# Patient Record
Sex: MALE | Race: OTHER | URBAN - METROPOLITAN AREA
[De-identification: names, ages, dates, MRNs, and addresses within clinical notes are randomized per-mention and may not be internally consistent; named-entity substitution may affect disease eponyms.]

---

## 2019-06-01 ENCOUNTER — OUTPATIENT (OUTPATIENT)
Dept: OUTPATIENT SERVICES | Facility: HOSPITAL | Age: 34
LOS: 1 days | End: 2019-06-01
Payer: MEDICAID

## 2019-06-01 PROCEDURE — G9001: CPT

## 2019-06-19 DIAGNOSIS — Z71.89 OTHER SPECIFIED COUNSELING: ICD-10-CM

## 2024-03-11 ENCOUNTER — EMERGENCY (EMERGENCY)
Facility: HOSPITAL | Age: 39
LOS: 1 days | Discharge: HOME | End: 2024-03-11
Attending: EMERGENCY MEDICINE | Admitting: EMERGENCY MEDICINE
Payer: MEDICAID

## 2024-03-11 VITALS
SYSTOLIC BLOOD PRESSURE: 181 MMHG | WEIGHT: 169.98 LBS | DIASTOLIC BLOOD PRESSURE: 72 MMHG | OXYGEN SATURATION: 95 % | HEART RATE: 88 BPM | TEMPERATURE: 98 F | RESPIRATION RATE: 18 BRPM

## 2024-03-11 VITALS
TEMPERATURE: 99 F | RESPIRATION RATE: 18 BRPM | OXYGEN SATURATION: 97 % | DIASTOLIC BLOOD PRESSURE: 79 MMHG | HEART RATE: 63 BPM | SYSTOLIC BLOOD PRESSURE: 156 MMHG

## 2024-03-11 DIAGNOSIS — F11.13 OPIOID ABUSE WITH WITHDRAWAL: ICD-10-CM

## 2024-03-11 DIAGNOSIS — R51.9 HEADACHE, UNSPECIFIED: ICD-10-CM

## 2024-03-11 PROCEDURE — 99284 EMERGENCY DEPT VISIT MOD MDM: CPT

## 2024-03-11 RX ORDER — ACETAMINOPHEN 500 MG
975 TABLET ORAL ONCE
Refills: 0 | Status: COMPLETED | OUTPATIENT
Start: 2024-03-11 | End: 2024-03-11

## 2024-03-11 RX ORDER — ONDANSETRON 8 MG/1
1 TABLET, FILM COATED ORAL
Qty: 6 | Refills: 0
Start: 2024-03-11 | End: 2024-03-13

## 2024-03-11 RX ORDER — ONDANSETRON 8 MG/1
4 TABLET, FILM COATED ORAL ONCE
Refills: 0 | Status: COMPLETED | OUTPATIENT
Start: 2024-03-11 | End: 2024-03-11

## 2024-03-11 RX ORDER — ONDANSETRON 8 MG/1
1 TABLET, FILM COATED ORAL
Qty: 9 | Refills: 0
Start: 2024-03-11 | End: 2024-03-13

## 2024-03-11 RX ADMIN — ONDANSETRON 4 MILLIGRAM(S): 8 TABLET, FILM COATED ORAL at 15:25

## 2024-03-11 RX ADMIN — Medication 975 MILLIGRAM(S): at 15:24

## 2024-03-11 RX ADMIN — Medication 0.1 MILLIGRAM(S): at 15:29

## 2024-03-11 NOTE — ED PROVIDER NOTE - PHYSICAL EXAMINATION
GENERAL: Awake. Alert. NAD. Well nourished.  HEENT: NC/AT, Conjunctiva pink, no scleral icterus. Airway patent.   LUNGS: CTAB. No wheezes or rales noted.  CARDIAC:  RRR.  ABDOMEN: No masses noted. Soft, NT, ND, no rebound, no guarding.  EXT: No edema, no calf tenderness, distal pulses 2+ bilaterally  NEURO: A&Ox3. Moving all extremities. Sensation and strength intact throughout. No tremor noted.  SKIN: Warm and dry.  PSYCH: Normal affect.

## 2024-03-11 NOTE — ED PROVIDER NOTE - ATTENDING CONTRIBUTION TO CARE
38-year-old male with history of opiate abuse, on Suboxone, presents with diffuse body aches, nausea, diarrhea, abdominal cramping pain for 2 days after stopping taking Suboxone 3 days ago.  Patient states he stopped taking Suboxone because he does not want to be reliant on it anymore.  No chest pain or shortness of breath.  No fever.  On exam, patient is afebrile, vital signs are stable.  Patient has beads of sweat on his forehead.  Abdomen is soft, nontender, nondistended.  No gooseflesh.  Suspect opiate withdrawal.  Offered to give patient Suboxone in ED to help with withdrawal and recommended slow taper, but patient refused.  Prefers symptomatic treatment only.  Feels better after Zofran, clonidine.  Offered IV hydration to patient and labs, patient refused.  Offered SBIRT counselor, patient refused.  Tolerating p.o. in ED.  Patient is polite and grateful for assistance, wants to go home.  Stable for DC.

## 2024-03-11 NOTE — ED PROVIDER NOTE - OBJECTIVE STATEMENT
38M PMH Opioid use disorder on Suboxone 16 mg daily presenting to the emergency department with 2 days of bodyaches, chills, nausea, abdominal cramping and watery diarrhea associated with increased thirst and generalized headache status post not taking Suboxone for the past 3 days.  Patient states he has been trying to stop the Suboxone so he decided not to take it.  Denies other substance use.  Denies alcohol use, benzodiazepine use.  Patient reports he was initially using Vicodin and Percocet that he was given for the surgery he had on his neck 38M PMH Opioid use disorder on Suboxone 16 mg daily presenting to the emergency department with 2 days of bodyaches, chills, nausea, abdominal cramping and watery diarrhea associated with increased thirst and generalized headache status post not taking Suboxone for the past 3 days.  Patient states he has been trying to stop the Suboxone so he decided not to take it.  Denies other substance use.  Denies alcohol use, benzodiazepine use.  Patient reports he was initially using Vicodin and Percocet that he was given for the surgery he had on his neck for herniated discs. Denies chest pain, cough, sore throat.

## 2024-03-11 NOTE — ED PROVIDER NOTE - CLINICAL SUMMARY MEDICAL DECISION MAKING FREE TEXT BOX
38M PMH Opioid use disorder on Suboxone 16 mg daily presenting to the emergency department with 2 days of bodyaches, chills, nausea, abdominal cramping and watery diarrhea associated with increased thirst and generalized headache status post not taking Suboxone for the past 3 days. Given hx and physical, ddx includes but is not limited to opiate withdrawal, viral syndrome. Low concern for benzo/alcohol withdrawal (pt denies alcohol use/benzo use). Pt hemodynamically stable. Plan for ecg, COWS, zofran, tylenol, reassess, dc with possible SBIRT/rehab options.

## 2024-03-11 NOTE — ED ADULT TRIAGE NOTE - CHIEF COMPLAINT QUOTE
Pt BIBA from work c/o chills, tremors, diaphoresis, and body aches. Pt is attempting to quit Suboxone, is supposed to take 16mg every other day but has not taken in 3 days. Pt denies any drug/alcohol use in last 24 hours.

## 2024-03-11 NOTE — ED PROVIDER NOTE - NSFOLLOWUPINSTRUCTIONS_ED_ALL_ED_FT
Substance Use:    Chemical dependency is an addiction to drugs or alcohol. It is characterized by the repeated behavior of seeking out and using drugs and alcohol despite harmful consequences to the health and safety of oneself and others. Using drugs in a manner that brought you to an Emergency Room suggests you may have an drug abuse problem. Seek help at a drug addiction center.    SEEK IMMEDIATE MEDICAL CARE IF YOU HAVE ANY OF THE FOLLOWING SYMPTOMS: chest pain, shortness of breath, change in mental status, thoughts about hurting killing yourself, thoughts about hurting or killing somebody else, hallucinations, or worsening depression. Take loperamide as instructed on bottle to help with diarrhea.    Take zofran for nausea as needed as prescribed, this was sent to your pharmacy.    Substance Use:    Chemical dependency is an addiction to drugs or alcohol. It is characterized by the repeated behavior of seeking out and using drugs and alcohol despite harmful consequences to the health and safety of oneself and others. Using drugs in a manner that brought you to an Emergency Room suggests you may have an drug abuse problem. Seek help at a drug addiction center.    SEEK IMMEDIATE MEDICAL CARE IF YOU HAVE ANY OF THE FOLLOWING SYMPTOMS: chest pain, shortness of breath, change in mental status, thoughts about hurting killing yourself, thoughts about hurting or killing somebody else, hallucinations, or worsening depression.

## 2024-03-11 NOTE — ED PROVIDER NOTE - PATIENT PORTAL LINK FT
You can access the FollowMyHealth Patient Portal offered by [facility] by registering at the following website: [email or web address]. By joining [facility]’s FollowMyHealth portal, you will also be able to view your health information using other applications (apps) compatible with our system.

## 2024-03-11 NOTE — ED PROVIDER NOTE - PROGRESS NOTE DETAILS
Brielle Horton DO (PGY3): COWS 6. Advised pt to slowly wean suboxone to avoid acute withdrawal sx. Pt prescribed zofran to help with nausea. Pt declined SBIRT resources or outpt rehab resources. Pt made aware of plan. Questions regarding their symptoms were addressed. Advised to follow up with pcp. Given strict return precautions. Pt verbalized understanding.

## 2024-03-11 NOTE — ED ADULT NURSE NOTE - OBJECTIVE STATEMENT
Pt presents to ED A&Ox4 with c/o withdrawal symptoms. Pt reports going to clinic for suboxone treatment; pt reports last dose was 3 days ago. Pt presents with c/o abdominal pain with nausea and diarrhea, diaphoresis, and nasal congestion. Pt denies other drug/alcohol use. Denies PMH or prescribed medicine.

## 2024-03-11 NOTE — ED PROVIDER NOTE - NSDCPRINTRESULTS_ED_ALL_ED
Last seen 2/6/23- No future appt    Last refill 8/21/23 qty of 90x1    Please advise on refills, thanks     Patient requests all Lab, Cardiology, and Radiology Results on their Discharge Instructions